# Patient Record
Sex: FEMALE | Race: OTHER | HISPANIC OR LATINO
[De-identification: names, ages, dates, MRNs, and addresses within clinical notes are randomized per-mention and may not be internally consistent; named-entity substitution may affect disease eponyms.]

---

## 2024-06-06 ENCOUNTER — DASHBOARD ENCOUNTERS (OUTPATIENT)
Age: 64
End: 2024-06-06

## 2024-06-06 ENCOUNTER — OFFICE VISIT (OUTPATIENT)
Dept: URBAN - METROPOLITAN AREA CLINIC 115 | Facility: CLINIC | Age: 64
End: 2024-06-06
Payer: COMMERCIAL

## 2024-06-06 VITALS
TEMPERATURE: 97.5 F | HEART RATE: 58 BPM | BODY MASS INDEX: 44.88 KG/M2 | WEIGHT: 228.6 LBS | DIASTOLIC BLOOD PRESSURE: 62 MMHG | SYSTOLIC BLOOD PRESSURE: 118 MMHG | HEIGHT: 60 IN

## 2024-06-06 DIAGNOSIS — R14.0 BLOATING: ICD-10-CM

## 2024-06-06 DIAGNOSIS — K59.01 SLOW TRANSIT CONSTIPATION: ICD-10-CM

## 2024-06-06 DIAGNOSIS — D50.0 IRON DEFICIENCY ANEMIA DUE TO CHRONIC BLOOD LOSS: ICD-10-CM

## 2024-06-06 DIAGNOSIS — K21.00 GASTROESOPHAGEAL REFLUX DISEASE WITH ESOPHAGITIS WITHOUT HEMORRHAGE: ICD-10-CM

## 2024-06-06 DIAGNOSIS — R14.3 EXCESSIVE GAS: ICD-10-CM

## 2024-06-06 PROBLEM — 266433003: Status: ACTIVE | Noted: 2024-06-06

## 2024-06-06 PROCEDURE — 99214 OFFICE O/P EST MOD 30 MIN: CPT | Performed by: INTERNAL MEDICINE

## 2024-06-06 RX ORDER — PANTOPRAZOLE SODIUM 40 MG/1
1 TABLET TABLET, DELAYED RELEASE ORAL ONCE A DAY
Qty: 90 | Refills: 1 | Status: ACTIVE | COMMUNITY
Start: 2024-03-07

## 2024-06-06 RX ORDER — LEVOTHYROXINE SODIUM 125 UG/1
TABLET ORAL
Qty: 30 TABLET | Status: ACTIVE | COMMUNITY

## 2024-06-06 RX ORDER — ACETAMINOPHEN 325 MG
1 TABLET AS NEEDED TABLET ORAL
Status: ACTIVE | COMMUNITY

## 2024-06-06 RX ORDER — HYOSCYAMINE SULFATE 0.12 MG/1
1 TABLET AS NEEDED TABLET ORAL THREE TIMES A DAY
Qty: 270 TABLET | Refills: 1 | Status: ACTIVE | COMMUNITY
Start: 2024-03-07 | End: 2024-09-03

## 2024-06-06 RX ORDER — NAPROXEN 500 MG/1
TABLET ORAL
Qty: 30 TABLET | Status: ACTIVE | COMMUNITY

## 2024-06-06 RX ORDER — PANTOPRAZOLE SODIUM 40 MG/1
1 TABLET TABLET, DELAYED RELEASE ORAL ONCE A DAY
Qty: 90 | Refills: 1 | Status: ACTIVE | COMMUNITY
Start: 2023-10-26

## 2024-06-06 RX ORDER — LUBIPROSTONE 24 UG/1
1 CAPSULE WITH FOOD AND WATER CAPSULE, GELATIN COATED ORAL TWICE A DAY
Qty: 180 CAPSULE | Refills: 1 | Status: ACTIVE | COMMUNITY
Start: 2024-03-07 | End: 2024-09-03

## 2024-06-06 RX ORDER — PANTOPRAZOLE SODIUM 40 MG/1
1 TABLET TABLET, DELAYED RELEASE ORAL ONCE A DAY
Qty: 90 | Refills: 1 | OUTPATIENT
Start: 2024-06-06

## 2024-06-06 NOTE — HPI-TODAY'S VISIT:
Patient complaining of melena she does not speak English patient's granddaughter was translating for me.  She says she was also on iron pills.  Denies of any abdominal pain does complain of bloating and anasarca she underwent extensive work-up for leg swelling at [facility] and they placed her on defibrillator which was later removed at [facility].  Denies of any weakness or tiredness or fatigue she does complain of occasional constipation.  She did had endoscopic intervention in the past.  No loss of appetite.  No family stop colon cancer stomach cancer.  She does take ibuprofen every day for back pain.  8/31/2022 Patient presents for a follow up on EGD and colonoscopy. Patient states that she is doing well and she denies melena and hematochezia. She states that her iron levels have not been recently checked. She also states that she has been feeling a bit tired and that she feels pain in her left back area, and that it radiates to her leg.  10/26/2022 Patient presents for a follow up on labs. Labs show patient is not anemic. Patient states that right now the only symptom that she is having is that she is full of gas. Other than that she states that she is doing a lot better than before. Patient states that she is still on dicyclomine and she states that it is helping her with her symptoms.  4/19/2023 Patient presents for a follow up. Patient states that she has a lot of bloating and that dicyclomine did not help much. She denies any loose bowels and states that she has some constipation. Patient states that she is unsure if she has a hernia. She states that she has been on a low carbohydrate diet but that she is still unable to loose weight.  6/21/2023 Patient present to clinic today for a follow up. Patient states that the constipation has gotten a little better with the linzess, but it has not completely resolved. She states that the dicyclomine has been helping some but that the hyoscyamine has been helping even more. She also states that she has been gaining weight instead of losing.  10/26/2023 Patient presents for a follow up. Patient states that she was on linzess and that it was helping her constipation, but that she has not been on it due to insurance not wanting to contiue covering it. She is still on hyoscyamine and states that it helps her, and she is also still on pantoprazole. Patient states that she spome with her PCP about weight loss medications, but that she did not recommend it.  3/7/2024 Patient presents for a follow up. Patient states that she is still having constipation and that she has not been taking linzess due to being too expensive. She states that she has been taking the hyoscyamine which has been helping. She denies heartburn or acid reflux as she has been taking pantopraozle. Patient denies trying miralax powder in the past. She states that she tried IBgard, but that it did not sit well with her stomach. 6/6/24 6/6/2024 Patient came for a follow-up patient says that she is just feeling better than before she is unable to afford the lubiprostone or the Linzess or Trulance she says constipation improved on MiraLAX powder but not totally under control bloating also improved.  She is unable to tolerate IBgard due to the mint flavor.  No loss of weight no loss of appetite.

## 2024-09-19 ENCOUNTER — OFFICE VISIT (OUTPATIENT)
Dept: URBAN - METROPOLITAN AREA CLINIC 115 | Facility: CLINIC | Age: 64
End: 2024-09-19